# Patient Record
Sex: FEMALE | Race: WHITE
[De-identification: names, ages, dates, MRNs, and addresses within clinical notes are randomized per-mention and may not be internally consistent; named-entity substitution may affect disease eponyms.]

---

## 2019-08-25 ENCOUNTER — HOSPITAL ENCOUNTER (EMERGENCY)
Dept: HOSPITAL 59 - ER | Age: 54
Discharge: HOME | End: 2019-08-25
Payer: MEDICAID

## 2019-08-25 DIAGNOSIS — T46.2X1A: Primary | ICD-10-CM

## 2019-08-25 DIAGNOSIS — R42: ICD-10-CM

## 2019-08-25 PROCEDURE — 99282 EMERGENCY DEPT VISIT SF MDM: CPT

## 2019-08-25 NOTE — EMERGENCY DEPARTMENT RECORD
History of Present Illness





- General


Chief Complaint: Ingestion


Stated Complaint: Took extra dose of amlodipine


Time Seen by Provider: 19 21:54


Source: Patient


Mode of Arrival: Ambulatory


Limitations: No limitations





- History of Present Illness


Initial Comments: 





54 yo female presents to ED for evaluation after accidentally taking a dose of 

her evening BP medication 13 hours ago this morning.  Patient reports that she 

felt dizzy throughout the day, however reports that she is feeling better this 

evening.  Patient called her PCP who asked that she come to the ED for 

evaluation of her BP.  Patient denies chest pain, difficulty breathing, or near-

syncope symptoms.


MD Complaint: Other


Onset/Timin


-: Days(s)


Radiation: None


Severity: Mild


Severity scale (1-10): 1


Consistency: Constant


Improves With: Nothing


Worsens With: Nothing


Associated Symptoms: Other (Lightheadedness)





- Related Data


                                Home Medications











 Medication  Instructions  Recorded  Confirmed  Last Taken


 


Benazepril/Hydrochlorothiazide 1 each PO DAILY 19 1 Day Ago





[Benazepril-Hctz 10-12.5 mg Tab]    ~19


 


Cannabidiol (Cbd) Extract 1 ml PO DAILY 19 1 Day Ago





[Epidiolex]    ~19


 


Glucosam/Chond/MSM/Boron/Hyal 1 each PO DAILY 19 1 Day Ago





[Glucosamine-Chondr Complex Tab]    ~19


 


Reuma-Art  19  1 Day Ago





    ~19











                                    Allergies











Allergy/AdvReac Type Severity Reaction Status Date / Time


 


hydrocodone bitartrate Allergy  DIFFICULTY Verified 19 22:00





[From Vicodin]   BREATHING  


 


Sulfa (Sulfonamide Allergy  HYPERSENSIT Verified 19 22:00





Antibiotics)   IVITY  














Travel Screening





- Travel/Exposure Within Last 30 Days


Have you traveled within the last 30 days?: No





- Travel/Exposure Within Last Year


Have you traveled outside the [place] in the last year?: No





- Additonal Travel Details


Have you been exposed to anyone with a communicable illness?: No





- Travel Symptoms


Symptom Screening: None





Review of Systems


Constitutional: Denies: Chills, Fever, Malaise, Night sweats


Eyes: Denies: Eye discharge, Eye pain


ENT: Denies: Congestion, Ear pain, Epistaxis


Respiratory: Denies: Cough, Dyspnea


Cardiovascular: Denies: Chest pain, Dyspnea on exertion


Endocrine: Denies: Fatigue, Heat or cold intolerance


Gastrointestinal: Denies: Abdominal pain, Nausea, Vomiting


Genitourinary: Denies: Incontinence, Retention


Musculoskeletal: Denies: Arthralgia, Back pain


Skin: Denies: Bruising, Change in color


Neurological: Reports: Vertigo.  Denies: Abnormal gait, Confusion, Headache, 

Seizure


Psychiatric: Denies: Anxiety


Hematological/Lymphatic: Denies: Anemia, Blood Clots





Past Medical History





- SOCIAL HISTORY


Smoking Status: Never smoker


Alcohol Use: None


Drug Use: None





- RESPIRATORY


Hx Respiratory Disorders: Yes


Hx Bronchitis: Yes





- CARDIOVASCULAR


Hx Cardio Disorders: Yes


Hx Hypertension: Yes





- NEURO


Hx Neuro Disorders: No





- GI


Hx GI Disorders: No





- 


Hx Genitourinary Disorders: No





- ENDOCRINE


Hx Endocrine Disorders: No





- MUSCULOSKELETAL


Hx Musculoskeletal Disorders: No





- PSYCH


Hx Psych Problems: No





- HEMATOLOGY/ONCOLOGY


Hx Hematology/Oncology Disorders: No





Family Medical History


Any Significant Family History?: Yes


Hx Cancer: Father, Mother





Physical Exam





- General


General Appearance: Alert, Oriented x3, Cooperative, No acute distress, Other 

(/94)


Limitations: No limitations





- Head


Head exam: Atraumatic, Normocephalic, Normal inspection


Head exam detail: negative: Abrasion, Contusion, Wong's sign, General 

tenderness, Hematoma, Laceration





- Eye


Eye exam: Normal appearance.  negative: Conjunctival injection, Periorbital 

swelling, Periorbital tenderness, Scleral icterus





- ENT


Ear exam: negative: Auricular hematoma, Auricular trauma


Nasal Exam: negative: Active bleeding, Discharge, Dried blood, Foreign body


Mouth exam: negative: Drooling, Laceration, Muffled voice, Tongue elevation





- Neck


Neck exam: Normal inspection.  negative: Meningismus, Tenderness





- Respiratory


Respiratory exam: Normal lung sounds bilaterally.  negative: Rales, Respiratory 

distress, Rhonchi, Stridor





- Cardiovascular


Cardiovascular Exam: Regular rate, Normal rhythm, Normal heart sounds





- GI/Abdominal


GI/Abdominal exam: Soft.  negative: Rebound, Rigid, Tenderness





- Rectal


Rectal exam: Deferred





- 


 exam: Deferred





- Extremities


Extremities exam: Normal inspection.  negative: Pedal edema, Tenderness





- Back


Back exam: Denies: CVA tenderness (R), CVA tenderness (L)





- Neurological


Neurological exam: Alert, Normal gait, Oriented X3





- Psychiatric


Psychiatric exam: Normal affect, Normal mood





- Skin


Skin exam: Normal color.  negative: Abrasion


Type of lesion: negative: abrasion





Course





                                   Vital Signs











  19





  21:57


 


Temperature 99.0 F


 


Pulse Rate [ 82





Left] 


 


Respiratory 18





Rate 


 


Blood Pressure 145/94





[Left Arm] 


 


Pulse Ox 97














- Reevaluation(s)


Reevaluation #1: 





19 22:26


Patient was seen and examined


/94 at this time, patient feels at her baseline.


Patient was instructed to rest this evening, may resume her normal medication 

dosing tomorrow evening.


Patient appears stable for discharge at this time.





Disposition


Disposition: Discharge


Clinical Impression: 


Accidental drug ingestion


Qualifiers:


 Encounter type: initial encounter Qualified Code(s): T50.901A - Poisoning by 

unspecified drugs, medicaments and biological substances, accidental 

(unintentional), initial encounter





Disposition: Home, Self-Care


Condition: (2) Stable


Instructions:  Adult Overdose (ED)


Additional Instructions: 


Return to ED if your symptoms worsen or if you have any concerns.


Resume your medication tomorrow evening as prescribed.


Follow-up with your family doctor in 3-5 days as directed.


Forms:  Patient Portal Access


Time of Disposition: 22:17





Quality





- Quality Measures


Quality Measures: N/A





- Blood Pressure Screening


Does Patient Have Any of the Following: Active Dx of HTN


Blood Pressure Classification: Hypertensive Reading


Systolic Measurement: 145


Diastolic Measurement: 94


Screening for High Blood Pressure: Patient Exclusion, Hx of HTN []